# Patient Record
Sex: FEMALE | Race: WHITE | NOT HISPANIC OR LATINO | URBAN - METROPOLITAN AREA
[De-identification: names, ages, dates, MRNs, and addresses within clinical notes are randomized per-mention and may not be internally consistent; named-entity substitution may affect disease eponyms.]

---

## 2017-05-30 ENCOUNTER — GENERIC CONVERSION - ENCOUNTER (OUTPATIENT)
Dept: OTHER | Facility: OTHER | Age: 22
End: 2017-05-30

## 2017-10-04 ENCOUNTER — GENERIC CONVERSION - ENCOUNTER (OUTPATIENT)
Dept: OTHER | Facility: OTHER | Age: 22
End: 2017-10-04

## 2018-01-11 NOTE — MISCELLANEOUS
Message   Recorded as Task   Date: 06/20/2017 04:41 PM, Created By: Jonna Ram   Task Name: Medical Complaint Callback   Assigned To: Chip Marleny   Regarding Patient: Sherie Meraz, Status: In Progress   Comment:    Bernadette Ford - 20 Jun 2017 4:41 PM     TASK CREATED  Caller: Self; Medical Complaint; [phone] (Home)  Pt states she was given generic form of Minastrin and she is unhappy with the way it is making her feel  Mai Mccarthy - 20 Jun 2017 4:55 PM     TASK IN PROGRESS   Mai Mccarthy - 20 Jun 2017 4:56 PM     TASK EDITED  pt has apt with ed for yrly in oct    req to go back to minastrin brand necessary due to headaches and not feeling well  rx to [facility]        Active Problems    1  Cervicitis (616 0) (N72)   2  Contraceptives (V25 02)   3  Encounter for contraceptive surveillance (V25 40) (Z30 40)   4  Encounter for gynecological examination without abnormal finding (V72 31) (Z01 419)   5  Screen for STD (sexually transmitted disease) (V74 5) (Z11 3)    Current Meds   1  Minastrin 24 Fe 1-20 MG-MCG(24) Oral Tablet Chewable; Take one daily; Therapy: [id number] to (Evaluate:[id number])  Requested for: [id number]; Last   Rx:[id number] Ordered    Allergies    1  No Known Drug Allergies    Plan  Contraceptives    · Minastrin 24 Fe 1-20 MG-MCG(24) Oral Tablet Chewable (Norethin Ace-Eth  Estrad-FE);  Take one daily    Signatures   Electronically signed by : Edwina Acuna, ; Jun 20 2017  4:56PM EST                       (Author)

## 2018-01-15 NOTE — MISCELLANEOUS
Message   Recorded as Task   Date: 09/28/2016 03:14 PM, Created By: Jennifer Wilson   Task Name: Med Renewal Request   Assigned To: Lizzy Cruz   Regarding Patient: Trinity Harris, Status: Active   CommentSamual Kinds - 28 Sep 2016 3:14 PM     TASK CREATED  Caller: Self; [phone] (Home)  pt ins changed needs rx [facility] to get rx filled        Active Problems    1  Cervicitis (616 0) (N72)   2  Contraceptives (V25 02)   3  Encounter for contraceptive surveillance (V25 40) (Z30 40)   4  Encounter for gynecological examination without abnormal finding (V72 31) (Z01 419)   5  Screen for STD (sexually transmitted disease) (V74 5) (Z11 3)    Current Meds   1  Minastrin 24 Fe 1-20 MG-MCG(24) Oral Tablet Chewable; Take one daily; Therapy: [id number] to (Marzena Elam)  Requested for: 73SJE2784; Last   Rx:[id number] Ordered    Allergies    1  No Known Drug Allergies    Plan  Contraceptives    · Minastrin 24 Fe 1-20 MG-MCG(24) Oral Tablet Chewable;  Take one daily    Signatures   Electronically signed by : Lashell Stephens, ; Sep 28 2016  3:14PM EST                       (Author)

## 2018-01-22 VITALS
WEIGHT: 119 LBS | HEIGHT: 63 IN | SYSTOLIC BLOOD PRESSURE: 118 MMHG | BODY MASS INDEX: 21.09 KG/M2 | DIASTOLIC BLOOD PRESSURE: 80 MMHG

## 2018-02-19 ENCOUNTER — TELEPHONE (OUTPATIENT)
Dept: OBGYN CLINIC | Facility: CLINIC | Age: 23
End: 2018-02-19

## 2018-02-19 DIAGNOSIS — Z30.09 BIRTH CONTROL COUNSELING: Primary | ICD-10-CM

## 2018-02-19 RX ORDER — NORETHINDRONE ACETATE AND ETHINYL ESTRADIOL AND FERROUS FUMARATE 1MG-20(24)
KIT ORAL
Qty: 90 TABLET | Refills: 1 | Status: SHIPPED | OUTPATIENT
Start: 2018-02-19 | End: 2018-12-26 | Stop reason: CLARIF

## 2018-02-19 NOTE — TELEPHONE ENCOUNTER
Pt needs birth control  refill call in to [phone] fax # [phone]  Pharmacy has change from [facility] to [facility] it won't let me change it

## 2018-02-22 ENCOUNTER — TELEPHONE (OUTPATIENT)
Dept: OBGYN CLINIC | Facility: CLINIC | Age: 23
End: 2018-02-22

## 2018-02-22 NOTE — TELEPHONE ENCOUNTER
Called pt to inform of recommendation for Janelle Hernandez  Pt would" like to check with her insurance comp first to see if it is covered " will call office tomorrow to inform    Await pts' p c

## 2018-02-22 NOTE — TELEPHONE ENCOUNTER
Patient called - her OCP was switched out to a generic form of Minastrin 24 FE  Patient states that the generic brand does not work for her  She was given the generic form couple of months ago and used it for a few weeks - didn't like it  Was able to get back on the Minastrin, but now her insurance does not cover it  Patient would like to have it changed to something else  Please advise  Thanks! Send back to Ob/Gyn clinical pool

## 2018-02-22 NOTE — TELEPHONE ENCOUNTER
Called pt   Pt states my ins will cover lo loestrin  Pharmacy change- pt will be using [facility]  Will order tomorrow   Am

## 2018-02-23 DIAGNOSIS — Z30.011 ORAL CONTRACEPTIVE PRESCRIBED: Primary | ICD-10-CM

## 2018-02-23 NOTE — TELEPHONE ENCOUNTER
Pharmacy changed in pts' chart  - Lo Loestrin Fe  Take 1 po daily  Disp 1 pk with 7 r/f's called to pharmacy  Pt called to inform

## 2018-02-26 ENCOUNTER — TELEPHONE (OUTPATIENT)
Dept: OBGYN CLINIC | Facility: CLINIC | Age: 23
End: 2018-02-26

## 2018-02-26 NOTE — TELEPHONE ENCOUNTER
Pt called office today, left voicemail message  Pt saw Dr Remedios Roman on 10/4/17,scheduled to see her again on 10/9/18  Pt states she is calling to request a refill of her BC pills ( Lo Loestrin)  Pt can be reached @ [phone]

## 2018-02-27 ENCOUNTER — TELEPHONE (OUTPATIENT)
Dept: OBGYN CLINIC | Facility: CLINIC | Age: 23
End: 2018-02-27

## 2018-03-26 ENCOUNTER — TELEPHONE (OUTPATIENT)
Dept: OBGYN CLINIC | Facility: CLINIC | Age: 23
End: 2018-03-26

## 2018-12-26 ENCOUNTER — ANNUAL EXAM (OUTPATIENT)
Dept: OBGYN CLINIC | Facility: CLINIC | Age: 23
End: 2018-12-26
Payer: COMMERCIAL

## 2018-12-26 VITALS
DIASTOLIC BLOOD PRESSURE: 70 MMHG | WEIGHT: 118 LBS | SYSTOLIC BLOOD PRESSURE: 102 MMHG | HEIGHT: 62 IN | BODY MASS INDEX: 21.71 KG/M2

## 2018-12-26 DIAGNOSIS — Z01.419 ENCOUNTER FOR GYNECOLOGICAL EXAMINATION: Primary | ICD-10-CM

## 2018-12-26 DIAGNOSIS — Z30.011 ORAL CONTRACEPTIVE PRESCRIBED: ICD-10-CM

## 2018-12-26 PROCEDURE — G0145 SCR C/V CYTO,THINLAYER,RESCR: HCPCS | Performed by: OBSTETRICS & GYNECOLOGY

## 2018-12-26 PROCEDURE — S0612 ANNUAL GYNECOLOGICAL EXAMINA: HCPCS | Performed by: OBSTETRICS & GYNECOLOGY

## 2018-12-26 NOTE — PROGRESS NOTES
Nelly Nick  1995    CC:  Yearly exam    S:  24 y o  female here for yearly exam  Her cycles are irregular, not heavy or crampy  She is sexually active  She uses BCP for contraception and is happy to continue using her current BCP  She does not request STD testing today  Last Pap 5/26/2016 - negative      Current Outpatient Prescriptions:     Norethin Ace-Eth Estrad-FE (MINASTRIN 24 FE) 1-20 MG-MCG(24) CHEW, Take one tab daily, Disp: 90 tablet, Rfl: 1    Norethin-Eth Estrad-Fe Biphas (LO LOESTRIN FE) 1 MG-10 MCG / 10 MCG TABS, Take one tablet daily, Disp: 28 tablet, Rfl: 7  Social History     Social History    Marital status: Single     Spouse name: N/A    Number of children: N/A    Years of education: N/A     Occupational History    Not on file  Social History Main Topics    Smoking status: Never Smoker    Smokeless tobacco: Never Used    Alcohol use 0 6 - 1 2 oz/week     1 - 2 Glasses of wine per week    Drug use: No    Sexual activity: Yes     Birth control/ protection: OCP     Other Topics Concern    Not on file     Social History Narrative    Caffeine use denied     Exercise: running    Moderate exercising less than 3 times/week     Family History   Problem Relation Age of Onset    No Known Problems Mother     No Known Problems Father     Diabetes Maternal Grandmother     Diabetes Maternal Grandfather     No Known Problems Family     No Known Problems Brother      History reviewed  No pertinent past medical history  O:  Blood pressure 102/70, height 5' 2" (1 575 m), weight 53 5 kg (118 lb), last menstrual period 11/28/2018  Patient appears well and is not in distress  Neck is supple without masses  Breasts are symmetrical without mass, tenderness, nipple discharge, skin changes or adenopathy  Abdomen is soft and nontender without masses  External genitals are normal without lesions or rashes  Vagina is normal without discharge or bleeding     Cervix is normal without discharge or lesion  Uterus is normal, mobile, nontender without palpable mass  Adnexa are normal, nontender, without palpable mass  A:  Yearly exam      P:  RTO one year for yearly exam or sooner as needed  Pap done - will call with results  Rx for BCP sent

## 2018-12-27 DIAGNOSIS — Z30.011 ORAL CONTRACEPTIVE PRESCRIBED: ICD-10-CM

## 2018-12-27 RX ORDER — NORETHINDRONE ACETATE AND ETHINYL ESTRADIOL, ETHINYL ESTRADIOL AND FERROUS FUMARATE 1MG-10(24)
KIT ORAL
Qty: 84 TABLET | Refills: 2 | Status: SHIPPED | OUTPATIENT
Start: 2018-12-27 | End: 2019-06-12 | Stop reason: SDUPTHER

## 2018-12-28 LAB
LAB AP GYN PRIMARY INTERPRETATION: NORMAL
LAB AP LMP: NORMAL
Lab: NORMAL

## 2019-01-02 ENCOUNTER — TELEPHONE (OUTPATIENT)
Dept: OBGYN CLINIC | Facility: CLINIC | Age: 24
End: 2019-01-02

## 2019-06-12 ENCOUNTER — TELEPHONE (OUTPATIENT)
Dept: OBGYN CLINIC | Facility: CLINIC | Age: 24
End: 2019-06-12

## 2019-06-12 DIAGNOSIS — Z30.011 ORAL CONTRACEPTIVE PRESCRIBED: ICD-10-CM

## 2019-06-17 ENCOUNTER — TELEPHONE (OUTPATIENT)
Dept: OBGYN CLINIC | Facility: CLINIC | Age: 24
End: 2019-06-17

## 2020-02-04 ENCOUNTER — TELEPHONE (OUTPATIENT)
Dept: OBGYN CLINIC | Facility: CLINIC | Age: 25
End: 2020-02-04

## 2020-02-04 ENCOUNTER — ANNUAL EXAM (OUTPATIENT)
Dept: OBGYN CLINIC | Facility: CLINIC | Age: 25
End: 2020-02-04
Payer: COMMERCIAL

## 2020-02-04 VITALS
WEIGHT: 118 LBS | SYSTOLIC BLOOD PRESSURE: 98 MMHG | BODY MASS INDEX: 20.91 KG/M2 | DIASTOLIC BLOOD PRESSURE: 78 MMHG | HEIGHT: 63 IN

## 2020-02-04 DIAGNOSIS — Z01.419 ENCOUNTER FOR GYNECOLOGICAL EXAMINATION WITHOUT ABNORMAL FINDING: Primary | ICD-10-CM

## 2020-02-04 PROCEDURE — S0612 ANNUAL GYNECOLOGICAL EXAMINA: HCPCS | Performed by: OBSTETRICS & GYNECOLOGY

## 2020-02-04 RX ORDER — SERTRALINE HYDROCHLORIDE 25 MG/1
TABLET, FILM COATED ORAL
COMMUNITY
Start: 2020-01-30

## 2020-02-04 NOTE — PROGRESS NOTES
Vanessa Standard  1995    CC:  Yearly exam    S:  21 y o  female here for yearly exam  Her cycles are regular, not heavy or crampy  Sexual activity: She is sexually active without pain, bleeding or dryness  Contraception:  She uses BCP for contraception  Her new insurance has made her BCP too expensive but she believes a prior authorization may work to lessen the cost   She would prefer to stay on Lo loestrin as she likes it other than the cost   Otherwise, she would prefer to try another low-dose BCP  We reviewed [facility] guidelines for Pap testing  Last Pap 12/26/2018 - normal      Current Outpatient Medications:     LORazepam (ATIVAN PO), Take by mouth, Disp: , Rfl:     sertraline (ZOLOFT) 25 mg tablet, , Disp: , Rfl:     Norethin-Eth Estrad-Fe Biphas (LO LOESTRIN FE) 1 MG-10 MCG / 10 MCG TABS, Take 1 tablet by mouth daily (Patient not taking: Reported on 2/4/2020), Disp: 84 tablet, Rfl: 2  Social History     Socioeconomic History    Marital status: Single     Spouse name: Not on file    Number of children: Not on file    Years of education: Not on file    Highest education level: Not on file   Occupational History    Not on file   Social Needs    Financial resource strain: Not on file    Food insecurity:     Worry: Not on file     Inability: Not on file    Transportation needs:     Medical: Not on file     Non-medical: Not on file   Tobacco Use    Smoking status: Never Smoker    Smokeless tobacco: Never Used   Substance and Sexual Activity    Alcohol use:  Yes     Alcohol/week: 1 0 - 3 0 standard drinks     Types: 1 - 3 Glasses of wine per week    Drug use: No    Sexual activity: Yes     Birth control/protection: Condom Male   Lifestyle    Physical activity:     Days per week: Not on file     Minutes per session: Not on file    Stress: Not on file   Relationships    Social connections:     Talks on phone: Not on file     Gets together: Not on file     Attends Ep[employer] service: Not on file     Active member of club or organization: Not on file     Attends meetings of clubs or organizations: Not on file     Relationship status: Not on file    Intimate partner violence:     Fear of current or ex partner: Not on file     Emotionally abused: Not on file     Physically abused: Not on file     Forced sexual activity: Not on file   Other Topics Concern    Not on file   Social History Narrative    Caffeine use denied     Exercise: running    Moderate exercising less than 3 times/week     Family History   Problem Relation Age of Onset    No Known Problems Mother     No Known Problems Father     Diabetes Maternal Grandmother     Diabetes Maternal Grandfather     No Known Problems Family     No Known Problems Brother      Past Medical History:   Diagnosis Date    Depression        Review of Systems   Respiratory: Negative  Cardiovascular: Negative  Gastrointestinal: Negative for constipation and diarrhea  Genitourinary: Negative for difficulty urinating, pelvic pain, vaginal bleeding, vaginal discharge, itching or odor  O:  Blood pressure 98/78, height 5' 2 5" (1 588 m), weight 53 5 kg (118 lb), last menstrual period 01/25/2020  Patient appears well and is not in distress  Neck is supple without masses  Breasts are symmetrical without mass, tenderness, nipple discharge, skin changes or adenopathy  Abdomen is soft and nontender without masses  External genitals are normal without lesions or rashes  Urethra and urethral meatus are normal  Bladder is normal to palpation  Vagina is normal without discharge or bleeding  Cervix is normal without discharge or lesion  Uterus is normal, mobile, nontender without palpable mass  Adnexa are normal, nontender, without palpable mass  A:  Yearly exam      P:   Pap due 2021  Will get authorization for BCP and Rx     RTO one year for yearly exam or sooner as needed

## 2020-02-04 NOTE — TELEPHONE ENCOUNTER
----- Message from Paresh Manriquez MD sent at 2/4/2020  4:40 PM EST -----  Regarding: Prior authorization  This patient was on Lo loestrin but her insurance co-pay is too high for her to afford  She thinks a prior auth may reduce the cost   Can you call to enquire if that is true? If not, can you get a list of what other low dose BCPs are covered by them in their first tier?     Thanks!!

## 2020-02-07 NOTE — TELEPHONE ENCOUNTER
No auth needed for this med when using a [facility] its a zero copay, lft info on pt's cell if she needs refill let us know

## 2021-03-02 ENCOUNTER — ANNUAL EXAM (OUTPATIENT)
Dept: OBGYN CLINIC | Facility: CLINIC | Age: 26
End: 2021-03-02
Payer: COMMERCIAL

## 2021-03-02 VITALS
DIASTOLIC BLOOD PRESSURE: 62 MMHG | BODY MASS INDEX: 21.68 KG/M2 | SYSTOLIC BLOOD PRESSURE: 118 MMHG | HEIGHT: 62 IN | WEIGHT: 117.8 LBS

## 2021-03-02 DIAGNOSIS — Z01.419 ENCNTR FOR GYN EXAM (GENERAL) (ROUTINE) W/O ABN FINDINGS: Primary | ICD-10-CM

## 2021-03-02 PROCEDURE — S0612 ANNUAL GYNECOLOGICAL EXAMINA: HCPCS | Performed by: OBSTETRICS & GYNECOLOGY

## 2021-03-02 PROCEDURE — G0145 SCR C/V CYTO,THINLAYER,RESCR: HCPCS | Performed by: OBSTETRICS & GYNECOLOGY

## 2021-03-02 NOTE — PROGRESS NOTES
Kim Franklin  1995    CC:  Yearly exam    S:  31 y o  female here for yearly exam  Her cycles are regular, not heavy or crampy  She is getting  in [place] at [place] in [place]  They will likely try for children in the first year or two  Discussed PNV prior to trying and importance of folic acid in early development  Discussed carrier screening - she will check with her fiance and with insurance and let us know if she wishes to pursue this  Sexual activity: She is sexually active without pain, bleeding or dryness  Contraception:  She uses condoms for contraception  STD testing:  She does not request STD testing today  Gardasil:  She has had the Gardasil series  We reviewed [facility] guidelines for Pap testing  Last Pap 12/26/2018 - negative      Current Outpatient Medications:     LORazepam (ATIVAN PO), Take by mouth, Disp: , Rfl:     sertraline (ZOLOFT) 50 mg tablet, 50 mg, Disp: , Rfl:     Norethin-Eth Estrad-Fe Biphas (LO LOESTRIN FE) 1 MG-10 MCG / 10 MCG TABS, Take 1 tablet by mouth daily (Patient not taking: Reported on 2/4/2020), Disp: 84 tablet, Rfl: 2    sertraline (ZOLOFT) 25 mg tablet, , Disp: , Rfl:   Social History     Socioeconomic History    Marital status: Single     Spouse name: Not on file    Number of children: Not on file    Years of education: Not on file    Highest education level: Not on file   Occupational History    Not on file   Social Needs    Financial resource strain: Not on file    Food insecurity     Worry: Not on file     Inability: Not on file    Transportation needs     Medical: Not on file     Non-medical: Not on file   Tobacco Use    Smoking status: Never Smoker    Smokeless tobacco: Never Used   Substance and Sexual Activity    Alcohol use:  Yes     Alcohol/week: 1 0 - 3 0 standard drinks     Types: 1 - 3 Glasses of wine per week    Drug use: No    Sexual activity: Yes     Partners: Male     Birth control/protection: Condom Male   Lifestyle    Physical activity     Days per week: Not on file     Minutes per session: Not on file    Stress: Not on file   Relationships    Social connections     Talks on phone: Not on file     Gets together: Not on file     Attends Yazidi service: Not on file     Active member of club or organization: Not on file     Attends meetings of clubs or organizations: Not on file     Relationship status: Not on file    Intimate partner violence     Fear of current or ex partner: Not on file     Emotionally abused: Not on file     Physically abused: Not on file     Forced sexual activity: Not on file   Other Topics Concern    Not on file   Social History Narrative    Caffeine use denied     Exercise: running    Moderate exercising less than 3 times/week     Family History   Problem Relation Age of Onset    No Known Problems Mother     No Known Problems Father     Diabetes Maternal Grandmother     Diabetes Maternal Grandfather     No Known Problems Family     No Known Problems Brother      Past Medical History:   Diagnosis Date    Depression        Review of Systems   Respiratory: Negative  Cardiovascular: Negative  Gastrointestinal: Negative for constipation and diarrhea  Genitourinary: Negative for difficulty urinating, pelvic pain, vaginal bleeding, vaginal discharge, itching or odor  O:  Blood pressure 118/62, height 5' 2" (1 575 m), weight 53 4 kg (117 lb 12 8 oz), last menstrual period 02/04/2021  Patient appears well and is not in distress  Neck is supple without masses  Breasts are symmetrical without mass, tenderness, nipple discharge, skin changes or adenopathy  Abdomen is soft and nontender without masses  External genitals are normal without lesions or rashes  Urethra and urethral meatus are normal  Bladder is normal to palpation  Vagina is normal without discharge or bleeding  Cervix is normal without discharge or lesion     Uterus is normal, mobile, nontender without palpable mass  Adnexa are normal, nontender, without palpable mass  A:  Yearly exam      P:   Pap done - will call with results    RTO one year for yearly exam or sooner as needed

## 2021-03-08 LAB
LAB AP GYN PRIMARY INTERPRETATION: NORMAL
LAB AP LMP: NORMAL
Lab: NORMAL